# Patient Record
Sex: FEMALE | Race: WHITE | NOT HISPANIC OR LATINO | URBAN - NONMETROPOLITAN AREA
[De-identification: names, ages, dates, MRNs, and addresses within clinical notes are randomized per-mention and may not be internally consistent; named-entity substitution may affect disease eponyms.]

---

## 2017-08-29 PROBLEM — H16.223: Noted: 2019-10-24

## 2017-08-29 PROBLEM — D31.32: Noted: 2018-10-11

## 2017-08-29 PROBLEM — H52.4: Noted: 2017-08-29

## 2019-10-24 ENCOUNTER — IMPORTED ENCOUNTER (OUTPATIENT)
Dept: URBAN - NONMETROPOLITAN AREA CLINIC 1 | Facility: CLINIC | Age: 54
End: 2019-10-24

## 2019-10-24 PROCEDURE — 92015 DETERMINE REFRACTIVE STATE: CPT

## 2019-10-24 PROCEDURE — 92014 COMPRE OPH EXAM EST PT 1/>: CPT

## 2019-10-24 NOTE — PATIENT DISCUSSION
Compound Myopic Astigmatism OU w/Presbyopia-   Discussed refractive status w/ pt today-   new spectacle Rx issued-   Monitor yearly or PRN Choroidal Nevus-  Discussed findings with pt today-  Stable in shape and size-  recommend UV protection -  May need to optain baseline Fundus Photos in future-  Continue to monitorDES-  discussed findings w/patient -  Pt asymptomatic at this time-  Continue to monitor PRN; 's Notes: MR 10/24/2019DFE 10/24/2019

## 2020-10-29 ENCOUNTER — IMPORTED ENCOUNTER (OUTPATIENT)
Dept: URBAN - NONMETROPOLITAN AREA CLINIC 1 | Facility: CLINIC | Age: 55
End: 2020-10-29

## 2020-10-29 PROCEDURE — 92015 DETERMINE REFRACTIVE STATE: CPT

## 2020-10-29 PROCEDURE — 92014 COMPRE OPH EXAM EST PT 1/>: CPT

## 2020-10-29 NOTE — PATIENT DISCUSSION
Compound Myopic Astigmatism OU w/Presbyopia-   Discussed refractive status w/ pt today-   new spectacle Rx issued-   Monitor yearly or PRN Choroidal Nevus-  Discussed findings with pt today-  Stable in shape and size-  recommend UV protection -  May need to optain baseline Fundus Photos in future-  Continue to monitorDES-  discussed findings w/patient -  Pt asymptomatic at this time-  Continue to monitor PRN; 's Notes: MR 10/29/2020DFE 10/29/2020

## 2021-11-01 ENCOUNTER — IMPORTED ENCOUNTER (OUTPATIENT)
Dept: URBAN - NONMETROPOLITAN AREA CLINIC 1 | Facility: CLINIC | Age: 56
End: 2021-11-01

## 2021-11-01 PROCEDURE — 92015 DETERMINE REFRACTIVE STATE: CPT

## 2021-11-01 PROCEDURE — 92014 COMPRE OPH EXAM EST PT 1/>: CPT

## 2021-11-01 NOTE — PATIENT DISCUSSION
Compound Myopic Astigmatism OU w/Presbyopia-   Discussed refractive status w/ pt today-   new spectacle Rx issued-   Monitor yearly or PRN Choroidal Nevus-  Discussed findings with pt today-  Stable in shape and size-  recommend UV protection -  need to obtain baseline fundus photos-  Continue to monitor yearly or prnDES-  discussed findings w/patient -  stable findings at this time-  Pt asymptomatic at this time-  Continue to monitor PRN; Dr's Notes: MR 11/1/2021DFE 11/1/2021

## 2022-04-16 ASSESSMENT — VISUAL ACUITY
OD_SC: 20/20
OS_SC: 20/20
OU_SC: 20/20
OU_SC: J1+
OD_SC: 20/20
OD_SC: 20/20
OS_SC: 20/20
OS_SC: 20/20
OU_SC: 20/20

## 2022-04-16 ASSESSMENT — TONOMETRY
OS_IOP_MMHG: 14
OS_IOP_MMHG: 15
OS_IOP_MMHG: 15
OD_IOP_MMHG: 15
OD_IOP_MMHG: 16
OD_IOP_MMHG: 13

## 2022-11-16 ENCOUNTER — COMPREHENSIVE EXAM (OUTPATIENT)
Dept: URBAN - NONMETROPOLITAN AREA CLINIC 4 | Facility: CLINIC | Age: 57
End: 2022-11-16

## 2022-11-16 DIAGNOSIS — H52.4: ICD-10-CM

## 2022-11-16 DIAGNOSIS — H52.13: ICD-10-CM

## 2022-11-16 DIAGNOSIS — H52.223: ICD-10-CM

## 2022-11-16 PROCEDURE — 92015 DETERMINE REFRACTIVE STATE: CPT

## 2022-11-16 PROCEDURE — 92014 COMPRE OPH EXAM EST PT 1/>: CPT

## 2022-11-16 ASSESSMENT — VISUAL ACUITY
OS_SC: 20/60
OS_PH: 20/25
OD_SC: 20/40
OD_PH: 20/25

## 2022-11-16 ASSESSMENT — TONOMETRY
OS_IOP_MMHG: 15
OD_IOP_MMHG: 15

## 2024-02-23 ENCOUNTER — COMPREHENSIVE EXAM (OUTPATIENT)
Dept: URBAN - NONMETROPOLITAN AREA CLINIC 4 | Facility: CLINIC | Age: 59
End: 2024-02-23

## 2024-02-23 DIAGNOSIS — H52.4: ICD-10-CM

## 2024-02-23 DIAGNOSIS — H52.223: ICD-10-CM

## 2024-02-23 DIAGNOSIS — H52.13: ICD-10-CM

## 2024-02-23 PROCEDURE — 92014 COMPRE OPH EXAM EST PT 1/>: CPT

## 2024-02-23 PROCEDURE — 92015 DETERMINE REFRACTIVE STATE: CPT

## 2024-02-23 ASSESSMENT — VISUAL ACUITY
OD_PH: 20/25
OS_SC: 20/50
OS_PH: 20/25
OD_SC: 20/40

## 2024-02-23 ASSESSMENT — TONOMETRY
OS_IOP_MMHG: 14
OD_IOP_MMHG: 14

## 2025-02-26 ENCOUNTER — COMPREHENSIVE EXAM (OUTPATIENT)
Age: 60
End: 2025-02-26

## 2025-02-26 DIAGNOSIS — H52.13: ICD-10-CM

## 2025-02-26 DIAGNOSIS — H16.223: ICD-10-CM

## 2025-02-26 DIAGNOSIS — H52.223: ICD-10-CM

## 2025-02-26 DIAGNOSIS — D31.32: ICD-10-CM

## 2025-02-26 DIAGNOSIS — H52.4: ICD-10-CM

## 2025-02-26 PROCEDURE — 92015 DETERMINE REFRACTIVE STATE: CPT

## 2025-02-26 PROCEDURE — 92014 COMPRE OPH EXAM EST PT 1/>: CPT
